# Patient Record
Sex: FEMALE | URBAN - METROPOLITAN AREA
[De-identification: names, ages, dates, MRNs, and addresses within clinical notes are randomized per-mention and may not be internally consistent; named-entity substitution may affect disease eponyms.]

---

## 2021-10-20 ENCOUNTER — APPOINTMENT (OUTPATIENT)
Age: 66
Setting detail: DERMATOLOGY
End: 2021-10-20

## 2021-10-20 DIAGNOSIS — I87.2 VENOUS INSUFFICIENCY (CHRONIC) (PERIPHERAL): ICD-10-CM

## 2021-10-20 DIAGNOSIS — L57.0 ACTINIC KERATOSIS: ICD-10-CM

## 2021-10-20 DIAGNOSIS — Z71.89 OTHER SPECIFIED COUNSELING: ICD-10-CM

## 2021-10-20 PROCEDURE — OTHER MIPS QUALITY: OTHER

## 2021-10-20 PROCEDURE — 99203 OFFICE O/P NEW LOW 30 MIN: CPT | Mod: 25

## 2021-10-20 PROCEDURE — OTHER LIQUID NITROGEN: OTHER

## 2021-10-20 PROCEDURE — OTHER COUNSELING: OTHER

## 2021-10-20 PROCEDURE — 17003 DESTRUCT PREMALG LES 2-14: CPT

## 2021-10-20 PROCEDURE — 17000 DESTRUCT PREMALG LESION: CPT

## 2021-10-20 PROCEDURE — OTHER SUNSCREEN RECOMMENDATIONS: OTHER

## 2021-10-20 PROCEDURE — OTHER REASSURANCE: OTHER

## 2021-10-20 ASSESSMENT — LOCATION DETAILED DESCRIPTION DERM
LOCATION DETAILED: RIGHT DISTAL PRETIBIAL REGION
LOCATION DETAILED: LEFT DISTAL DORSAL FOREARM
LOCATION DETAILED: LEFT DISTAL PRETIBIAL REGION
LOCATION DETAILED: LEFT PROXIMAL DORSAL FOREARM

## 2021-10-20 ASSESSMENT — LOCATION SIMPLE DESCRIPTION DERM
LOCATION SIMPLE: RIGHT PRETIBIAL REGION
LOCATION SIMPLE: LEFT PRETIBIAL REGION
LOCATION SIMPLE: LEFT FOREARM

## 2021-10-20 ASSESSMENT — LOCATION ZONE DERM
LOCATION ZONE: LEG
LOCATION ZONE: ARM

## 2021-10-20 NOTE — PROCEDURE: MIPS QUALITY
Quality 110: Preventive Care And Screening: Influenza Immunization: Influenza Immunization previously received during influenza season
Quality 134: Screening For Clinical Depression And Follow-Up Plan: The patient was screened for depression and the screen was negative and no follow up required
Quality 226: Preventive Care And Screening: Tobacco Use: Screening And Cessation Intervention: Patient screened for tobacco use and is an ex/non-smoker
Quality 400a: One-Time Screening For Hepatitis C Virus (Hcv) For All Patients: Patient received one-time screening for HCV infection
Quality 431: Preventive Care And Screening: Unhealthy Alcohol Use - Screening: Patient not identified as an unhealthy alcohol user when screened for unhealthy alcohol use using a systematic screening method
Quality 130: Documentation Of Current Medications In The Medical Record: Current Medications Documented
Quality 154 Part B: Falls: Risk Screening (Should Be Reported With Measure 155.): Patient screened for future fall risk; documentation of no falls in the past year or only one fall without injury in the past year
Quality 111:Pneumonia Vaccination Status For Older Adults: Pneumococcal Vaccination Previously Received
Quality 358: Patient-Centered Surgical Risk Assessment And Communication: Documentation of patient-specific risk assessment with a risk calculator based on multi-institutional clinical data, the specific risk calculator used, and communication of risk assessment from risk calculator with the patient or family.
Quality 154 Part A: Falls: Risk Assessment (Should Be Reported With Measure 155.): Falls risk assessment completed and documented in the past 12 months.
Detail Level: Detailed

## 2021-10-20 NOTE — PROCEDURE: LIQUID NITROGEN
Render Post-Care Instructions In Note?: no
Detail Level: Detailed
Consent: The patient's consent was obtained including but not limited to risks of crusting, scabbing, blistering, scarring, darker or lighter pigmentary change, recurrence, incomplete removal and infection.
Show Aperture Variable?: Yes
Post-Care Instructions: I reviewed with the patient in detail post-care instructions. Patient is to wear sunprotection, and avoid picking at any of the treated lesions. Pt may apply Vaseline to crusted or scabbing areas.
Duration Of Freeze Thaw-Cycle (Seconds): 3
Number Of Freeze-Thaw Cycles: 2 freeze-thaw cycles

## 2024-08-26 ENCOUNTER — APPOINTMENT (RX ONLY)
Dept: URBAN - METROPOLITAN AREA CLINIC 33 | Facility: CLINIC | Age: 69
Setting detail: DERMATOLOGY
End: 2024-08-26

## 2024-08-26 DIAGNOSIS — Z85.828 PERSONAL HISTORY OF OTHER MALIGNANT NEOPLASM OF SKIN: ICD-10-CM

## 2024-08-26 DIAGNOSIS — L57.0 ACTINIC KERATOSIS: ICD-10-CM | Status: INADEQUATELY CONTROLLED

## 2024-08-26 PROBLEM — D48.5 NEOPLASM OF UNCERTAIN BEHAVIOR OF SKIN: Status: ACTIVE | Noted: 2024-08-26

## 2024-08-26 PROCEDURE — 17003 DESTRUCT PREMALG LES 2-14: CPT

## 2024-08-26 PROCEDURE — ? LIQUID NITROGEN

## 2024-08-26 PROCEDURE — ? DIAGNOSIS COMMENT

## 2024-08-26 PROCEDURE — ? COUNSELING

## 2024-08-26 PROCEDURE — 17000 DESTRUCT PREMALG LESION: CPT

## 2024-08-26 PROCEDURE — ? TREATMENT REGIMEN

## 2024-08-26 ASSESSMENT — LOCATION DETAILED DESCRIPTION DERM
LOCATION DETAILED: LEFT ANTERIOR SHOULDER
LOCATION DETAILED: LEFT PROXIMAL RADIAL DORSAL FOREARM
LOCATION DETAILED: LEFT PROXIMAL POSTERIOR UPPER ARM
LOCATION DETAILED: LEFT ANTERIOR PROXIMAL UPPER ARM

## 2024-08-26 ASSESSMENT — LOCATION ZONE DERM
LOCATION ZONE: ARM
LOCATION ZONE: ARM

## 2024-08-26 ASSESSMENT — LOCATION SIMPLE DESCRIPTION DERM
LOCATION SIMPLE: LEFT UPPER ARM
LOCATION SIMPLE: LEFT SHOULDER
LOCATION SIMPLE: LEFT POSTERIOR UPPER ARM
LOCATION SIMPLE: LEFT FOREARM

## 2024-08-26 NOTE — PROCEDURE: DIAGNOSIS COMMENT
Render Risk Assessment In Note?: no
Comment: Right shoulder 2013
Detail Level: Simple
Comment: Left arm 2010

## 2024-08-26 NOTE — PROCEDURE: LIQUID NITROGEN
Aperture Size (Optional): A
Show Applicator Variable?: Yes
Duration Of Freeze Thaw-Cycle (Seconds): 1
Post-Care Instructions: I reviewed with the patient in detail post-care instructions. Patient is to wear sunprotection, and avoid picking at any of the treated lesions. Pt may apply Vaseline to crusted or scabbing areas.
Render Post-Care Instructions In Note?: no
Number Of Freeze-Thaw Cycles: 1 freeze-thaw cycle
Consent: The patient's consent was obtained including but not limited to risks of crusting, scabbing, blistering, scarring, darker or lighter pigmentary change, recurrence, incomplete removal and infection.
Detail Level: Zone

## 2024-10-07 ENCOUNTER — APPOINTMENT (RX ONLY)
Dept: URBAN - METROPOLITAN AREA CLINIC 33 | Facility: CLINIC | Age: 69
Setting detail: DERMATOLOGY
End: 2024-10-07

## 2024-10-07 DIAGNOSIS — L90.5 SCAR CONDITIONS AND FIBROSIS OF SKIN: ICD-10-CM | Status: STABLE

## 2024-10-07 DIAGNOSIS — L57.0 ACTINIC KERATOSIS: ICD-10-CM | Status: RESOLVING

## 2024-10-07 PROBLEM — D48.5 NEOPLASM OF UNCERTAIN BEHAVIOR OF SKIN: Status: ACTIVE | Noted: 2024-10-07

## 2024-10-07 PROCEDURE — ? PRESCRIPTION MEDICATION MANAGEMENT

## 2024-10-07 PROCEDURE — 99213 OFFICE O/P EST LOW 20 MIN: CPT

## 2024-10-07 PROCEDURE — ? PRESCRIPTION

## 2024-10-07 PROCEDURE — ? COUNSELING

## 2024-10-07 RX ORDER — TRIAMCINOLONE ACETONIDE 1 MG/G
CREAM TOPICAL
Qty: 80 | Refills: 0 | Status: ERX | COMMUNITY
Start: 2024-10-07

## 2024-10-07 RX ADMIN — TRIAMCINOLONE ACETONIDE: 1 CREAM TOPICAL at 00:00

## 2024-10-07 ASSESSMENT — LOCATION SIMPLE DESCRIPTION DERM
LOCATION SIMPLE: LEFT FOREARM
LOCATION SIMPLE: POSTERIOR NECK
LOCATION SIMPLE: LEFT SHOULDER
LOCATION SIMPLE: RIGHT SHOULDER

## 2024-10-07 ASSESSMENT — LOCATION DETAILED DESCRIPTION DERM
LOCATION DETAILED: RIGHT POSTERIOR SHOULDER
LOCATION DETAILED: LEFT POSTERIOR SHOULDER
LOCATION DETAILED: LEFT PROXIMAL RADIAL DORSAL FOREARM
LOCATION DETAILED: LEFT POSTERIOR NECK
LOCATION DETAILED: LEFT MEDIAL TRAPEZIAL NECK
LOCATION DETAILED: LEFT ANTERIOR SHOULDER

## 2024-10-07 ASSESSMENT — LOCATION ZONE DERM
LOCATION ZONE: ARM
LOCATION ZONE: ARM
LOCATION ZONE: NECK

## 2024-10-07 NOTE — PROCEDURE: PRESCRIPTION MEDICATION MANAGEMENT
Detail Level: Zone
Render In Strict Bullet Format?: No
Initiate Treatment: Triamcinolone cream to scars from shingles QHS

## 2025-04-07 ENCOUNTER — APPOINTMENT (OUTPATIENT)
Dept: URBAN - METROPOLITAN AREA CLINIC 33 | Facility: CLINIC | Age: 70
Setting detail: DERMATOLOGY
End: 2025-04-07

## 2025-04-07 DIAGNOSIS — Z85.828 PERSONAL HISTORY OF OTHER MALIGNANT NEOPLASM OF SKIN: ICD-10-CM

## 2025-04-07 DIAGNOSIS — L81.4 OTHER MELANIN HYPERPIGMENTATION: ICD-10-CM

## 2025-04-07 DIAGNOSIS — L82.1 OTHER SEBORRHEIC KERATOSIS: ICD-10-CM

## 2025-04-07 DIAGNOSIS — L82.0 INFLAMED SEBORRHEIC KERATOSIS: ICD-10-CM

## 2025-04-07 DIAGNOSIS — D18.0 HEMANGIOMA: ICD-10-CM

## 2025-04-07 DIAGNOSIS — L57.0 ACTINIC KERATOSIS: ICD-10-CM

## 2025-04-07 DIAGNOSIS — L90.5 SCAR CONDITIONS AND FIBROSIS OF SKIN: ICD-10-CM | Status: STABLE

## 2025-04-07 DIAGNOSIS — B02.29 OTHER POSTHERPETIC NERVOUS SYSTEM INVOLVEMENT: ICD-10-CM

## 2025-04-07 DIAGNOSIS — Z12.83 ENCOUNTER FOR SCREENING FOR MALIGNANT NEOPLASM OF SKIN: ICD-10-CM

## 2025-04-07 DIAGNOSIS — B02.9 ZOSTER WITHOUT COMPLICATIONS: ICD-10-CM

## 2025-04-07 PROBLEM — D18.01 HEMANGIOMA OF SKIN AND SUBCUTANEOUS TISSUE: Status: ACTIVE | Noted: 2025-04-07

## 2025-04-07 PROCEDURE — ? SUNSCREEN RECOMMENDATIONS

## 2025-04-07 PROCEDURE — ? FULL BODY SKIN EXAM

## 2025-04-07 PROCEDURE — ? PATIENT SPECIFIC COUNSELING

## 2025-04-07 PROCEDURE — 17000 DESTRUCT PREMALG LESION: CPT | Mod: 59

## 2025-04-07 PROCEDURE — 99213 OFFICE O/P EST LOW 20 MIN: CPT | Mod: 25

## 2025-04-07 PROCEDURE — ? COUNSELING

## 2025-04-07 PROCEDURE — 17110 DESTRUCTION B9 LES UP TO 14: CPT

## 2025-04-07 PROCEDURE — ? PRESCRIPTION MEDICATION MANAGEMENT

## 2025-04-07 PROCEDURE — ? LIQUID NITROGEN

## 2025-04-07 PROCEDURE — ? DIAGNOSIS COMMENT

## 2025-04-07 ASSESSMENT — LOCATION ZONE DERM
LOCATION ZONE: ARM
LOCATION ZONE: TRUNK
LOCATION ZONE: NECK

## 2025-04-07 ASSESSMENT — LOCATION DETAILED DESCRIPTION DERM
LOCATION DETAILED: LEFT PROXIMAL POSTERIOR UPPER ARM
LOCATION DETAILED: INFERIOR THORACIC SPINE
LOCATION DETAILED: LEFT POSTERIOR SHOULDER
LOCATION DETAILED: RIGHT ANTERIOR SHOULDER
LOCATION DETAILED: RIGHT POSTERIOR SHOULDER
LOCATION DETAILED: C5 RIGHT POSTERIOR DERMATOME
LOCATION DETAILED: LEFT MEDIAL TRAPEZIAL NECK
LOCATION DETAILED: LEFT POSTERIOR NECK
LOCATION DETAILED: MID TRAPEZIAL NECK

## 2025-04-07 ASSESSMENT — LOCATION SIMPLE DESCRIPTION DERM
LOCATION SIMPLE: LEFT SHOULDER
LOCATION SIMPLE: C5 RIGHT POSTERIOR DERMATOME
LOCATION SIMPLE: RIGHT SHOULDER
LOCATION SIMPLE: LEFT POSTERIOR UPPER ARM
LOCATION SIMPLE: TRAPEZIAL NECK
LOCATION SIMPLE: POSTERIOR NECK
LOCATION SIMPLE: UPPER BACK

## 2025-04-07 NOTE — PROCEDURE: LIQUID NITROGEN
Detail Level: Detailed
Show Applicator Variable?: Yes
Post-Care Instructions: I reviewed with the patient in detail post-care instructions. Patient is to wear sunprotection, and avoid picking at any of the treated lesions. Pt may apply Vaseline to crusted or scabbing areas.
Spray Paint Text: The liquid nitrogen was applied to the skin utilizing a spray paint frosting technique.
Consent: The patient's consent was obtained including but not limited to risks of crusting, scabbing, blistering, scarring, darker or lighter pigmentary change, recurrence, incomplete removal and infection.
Add 52 Modifier (Optional): no
Medical Necessity Clause: This procedure was medically necessary because the lesions that were treated were:
Medical Necessity Information: It is in your best interest to select a reason for this procedure from the list below. All of these items fulfill various CMS LCD requirements except the new and changing color options.
Number Of Freeze-Thaw Cycles: 1 freeze-thaw cycle
Detail Level: Zone
Duration Of Freeze Thaw-Cycle (Seconds): 1
Aperture Size (Optional): A

## 2025-04-07 NOTE — PROCEDURE: PATIENT SPECIFIC COUNSELING
Patient is currently seeing infectious disease doctor and was recommended to see if blood work is needed to make sure that that she has an immune response to herpes zoster and to determine if she will need to get revaccinated since original out break occurred after pt was vaccinated in 2021
Detail Level: Zone

## 2025-04-07 NOTE — PROCEDURE: PRESCRIPTION MEDICATION MANAGEMENT
Detail Level: Zone
Render In Strict Bullet Format?: No
Discontinue Regimen: Triamcinolone cream to scars from shingles QHS
Continue Regimen: Compound MIGDALIA, lido, acyclovir, amitriptyline